# Patient Record
Sex: MALE | Race: WHITE | NOT HISPANIC OR LATINO | Employment: UNEMPLOYED | URBAN - METROPOLITAN AREA
[De-identification: names, ages, dates, MRNs, and addresses within clinical notes are randomized per-mention and may not be internally consistent; named-entity substitution may affect disease eponyms.]

---

## 2023-04-04 ENCOUNTER — OFFICE VISIT (OUTPATIENT)
Dept: PRIMARY CARE | Facility: CLINIC | Age: 63
End: 2023-04-04
Payer: COMMERCIAL

## 2023-04-04 VITALS
BODY MASS INDEX: 28.63 KG/M2 | HEIGHT: 73 IN | WEIGHT: 216 LBS | TEMPERATURE: 98 F | RESPIRATION RATE: 18 BRPM | SYSTOLIC BLOOD PRESSURE: 118 MMHG | OXYGEN SATURATION: 98 % | DIASTOLIC BLOOD PRESSURE: 76 MMHG | HEART RATE: 85 BPM

## 2023-04-04 DIAGNOSIS — Z00.00 HEALTH CARE MAINTENANCE: Primary | ICD-10-CM

## 2023-04-04 DIAGNOSIS — I10 HYPERTENSION, ESSENTIAL, BENIGN: ICD-10-CM

## 2023-04-04 DIAGNOSIS — R45.89 DEPRESSED MOOD: ICD-10-CM

## 2023-04-04 DIAGNOSIS — E55.9 VITAMIN D DEFICIENCY: ICD-10-CM

## 2023-04-04 DIAGNOSIS — R39.12 WEAK URINE STREAM: ICD-10-CM

## 2023-04-04 DIAGNOSIS — R68.89 FORGETFULNESS: ICD-10-CM

## 2023-04-04 DIAGNOSIS — E78.5 HYPERLIPIDEMIA, UNSPECIFIED HYPERLIPIDEMIA TYPE: ICD-10-CM

## 2023-04-04 DIAGNOSIS — R73.03 PREDIABETES: ICD-10-CM

## 2023-04-04 DIAGNOSIS — I45.10 RBBB (RIGHT BUNDLE BRANCH BLOCK): ICD-10-CM

## 2023-04-04 PROBLEM — E80.6 HYPERBILIRUBINEMIA: Status: ACTIVE | Noted: 2023-04-04

## 2023-04-04 PROBLEM — K57.90 DIVERTICULOSIS: Status: ACTIVE | Noted: 2023-04-04

## 2023-04-04 PROBLEM — M10.9 GOUT: Status: ACTIVE | Noted: 2023-04-04

## 2023-04-04 LAB
POC APPEARANCE, URINE: CLEAR
POC BILIRUBIN, URINE: NEGATIVE
POC BLOOD, URINE: NEGATIVE
POC COLOR, URINE: YELLOW
POC GLUCOSE, URINE: NEGATIVE MG/DL
POC KETONES, URINE: NEGATIVE MG/DL
POC LEUKOCYTES, URINE: NEGATIVE
POC NITRITE,URINE: NEGATIVE
POC PH, URINE: 7 PH
POC PROTEIN, URINE: NEGATIVE MG/DL
POC SPECIFIC GRAVITY, URINE: 1.01
POC UROBILINOGEN, URINE: 0.2 EU/DL

## 2023-04-04 PROCEDURE — 1036F TOBACCO NON-USER: CPT | Performed by: FAMILY MEDICINE

## 2023-04-04 PROCEDURE — 93000 ELECTROCARDIOGRAM COMPLETE: CPT | Performed by: FAMILY MEDICINE

## 2023-04-04 PROCEDURE — 3078F DIAST BP <80 MM HG: CPT | Performed by: FAMILY MEDICINE

## 2023-04-04 PROCEDURE — 90471 IMMUNIZATION ADMIN: CPT | Performed by: FAMILY MEDICINE

## 2023-04-04 PROCEDURE — 81002 URINALYSIS NONAUTO W/O SCOPE: CPT | Performed by: FAMILY MEDICINE

## 2023-04-04 PROCEDURE — 3074F SYST BP LT 130 MM HG: CPT | Performed by: FAMILY MEDICINE

## 2023-04-04 PROCEDURE — 90750 HZV VACC RECOMBINANT IM: CPT | Performed by: FAMILY MEDICINE

## 2023-04-04 PROCEDURE — 99396 PREV VISIT EST AGE 40-64: CPT | Performed by: FAMILY MEDICINE

## 2023-04-04 RX ORDER — AMLODIPINE BESYLATE 10 MG/1
10 TABLET ORAL DAILY
COMMUNITY
Start: 2020-05-08 | End: 2023-04-28 | Stop reason: SDUPTHER

## 2023-04-04 RX ORDER — LOSARTAN POTASSIUM 100 MG/1
100 TABLET ORAL DAILY
COMMUNITY
Start: 2018-01-23 | End: 2023-04-28 | Stop reason: SDUPTHER

## 2023-04-04 RX ORDER — ATORVASTATIN CALCIUM 40 MG/1
40 TABLET, FILM COATED ORAL DAILY
COMMUNITY
Start: 2018-01-23 | End: 2023-04-28 | Stop reason: SDUPTHER

## 2023-04-04 ASSESSMENT — ENCOUNTER SYMPTOMS
SHORTNESS OF BREATH: 0
HEADACHES: 0

## 2023-04-04 NOTE — PROGRESS NOTES
"Subjective     Matt Disla is a 62 y.o. male who presents for Annual Exam (Pt. In for a complete physical.).    HPI   Pt is here for well exam.  He is concerned about some forgetfulness and mood changes over the past few months.  He is interested in getting evaluated.  He reports family history of alzheimers.      Review of Systems   Respiratory:  Negative for shortness of breath.    Cardiovascular:  Negative for chest pain.   Neurological:  Negative for headaches.       Objective     Vitals:    04/04/23 1005   BP: 118/76   BP Location: Right arm   Patient Position: Sitting   Pulse: 85   Resp: 18   Temp: 36.7 °C (98 °F)   TempSrc: Temporal   SpO2: 98%   Weight: 98 kg (216 lb)   Height: 1.854 m (6' 1\")        Current Outpatient Medications   Medication Instructions    amLODIPine (NORVASC) 10 mg, oral, Daily    atorvastatin (LIPITOR) 40 mg, oral, Daily    losartan (COZAAR) 100 mg, oral, Daily        Past Surgical History:   Procedure Laterality Date    OTHER SURGICAL HISTORY  12/17/2019    Tonsillectomy with adenoidectomy    OTHER SURGICAL HISTORY  04/01/2022    Colonoscopy        Social History     Tobacco Use    Smoking status: Never    Smokeless tobacco: Never        No family history on file.     Immunization History   Administered Date(s) Administered    Pfizer Gray Cap SARS-CoV-2 04/11/2022    Pfizer Purple Cap SARS-CoV-2 02/13/2021, 03/06/2021, 11/02/2021    Pfizer Sars-cov-2 Bivalent 30 mcg/0.3 mL 10/07/2022    Tdap 12/07/2017        Physical Exam  Vitals reviewed.   Constitutional:       General: He is not in acute distress.     Appearance: Normal appearance. He is not ill-appearing.   HENT:      Head: Normocephalic and atraumatic.      Right Ear: Tympanic membrane, ear canal and external ear normal.      Left Ear: Tympanic membrane, ear canal and external ear normal.      Nose: Nose normal.      Mouth/Throat:      Mouth: Mucous membranes are moist.      Pharynx: Oropharynx is clear. No oropharyngeal " exudate or posterior oropharyngeal erythema.   Eyes:      Extraocular Movements: Extraocular movements intact.      Pupils: Pupils are equal, round, and reactive to light.   Neck:      Thyroid: No thyroid mass or thyromegaly.   Cardiovascular:      Rate and Rhythm: Normal rate and regular rhythm.      Heart sounds: No murmur heard.  Pulmonary:      Effort: Pulmonary effort is normal. No respiratory distress.      Breath sounds: Normal breath sounds. No wheezing, rhonchi or rales.   Abdominal:      General: Abdomen is flat. There is no distension.      Palpations: Abdomen is soft. There is no mass.      Tenderness: There is no abdominal tenderness.   Genitourinary:     Testes: Normal.   Musculoskeletal:         General: Normal range of motion.   Lymphadenopathy:      Cervical: No cervical adenopathy.   Skin:     General: Skin is warm and dry.      Findings: No lesion or rash.   Neurological:      General: No focal deficit present.      Mental Status: He is alert and oriented to person, place, and time. Mental status is at baseline.      Gait: Gait normal.   Psychiatric:         Mood and Affect: Mood normal.         Behavior: Behavior normal.         Problem List Items Addressed This Visit       Health care maintenance - Primary    Relevant Orders    POCT UA (nonautomated w/o microscopy) manually resulted (Completed)    ECG 12 lead (Clinic Performed) (Completed)    Comprehensive Metabolic Panel    Lipid Panel    CBC and Auto Differential    Zoster vaccine, recombinant, adult (SHINGRIX)    Hyperlipidemia    Hypertension, essential, benign    Prediabetes    Relevant Orders    Hemoglobin A1C    Weak urine stream    Relevant Orders    Prostate Specific Antigen    RBBB (right bundle branch block)     Other Visit Diagnoses       Vitamin D deficiency        Relevant Orders    Vitamin D, Total    Forgetfulness        Relevant Orders    Referral to Neuropsychology    Vitamin B12    TSH with reflex to Free T4 if abnormal     Depressed mood        Relevant Orders    Referral to Neuropsychology    Vitamin B12    TSH with reflex to Free T4 if abnormal            Assessment/Plan     Well exam    Colon screening - utd, repeat 2026    Shingrix #1 given today    Tdap utd    Covid and flu vaccines utd    Healthy diet, routine exercise was discussed with patient      Hx of RBBB on ekg.      Cognitive difficulties - Trouble remembering things and recent bout of depression a few months ago - pt concerned about Alzheimers disesea due to family hx and wants to get an evaluation.  Referred to neuropsychology.      HTN, HLD - controlled, check labs, continue meds    Weak urine stream - check PSA.  We discussed urology referral and/or starting flomax.    Pt declined at this time.  Will monitor    Follow up  3 months or sooner if needed

## 2023-04-14 ENCOUNTER — LAB (OUTPATIENT)
Dept: LAB | Facility: LAB | Age: 63
End: 2023-04-14
Payer: COMMERCIAL

## 2023-04-14 DIAGNOSIS — R73.03 PREDIABETES: ICD-10-CM

## 2023-04-14 DIAGNOSIS — R39.12 WEAK URINE STREAM: ICD-10-CM

## 2023-04-14 DIAGNOSIS — R68.89 FORGETFULNESS: ICD-10-CM

## 2023-04-14 DIAGNOSIS — Z00.00 HEALTH CARE MAINTENANCE: ICD-10-CM

## 2023-04-14 DIAGNOSIS — R45.89 DEPRESSED MOOD: ICD-10-CM

## 2023-04-14 DIAGNOSIS — E55.9 VITAMIN D DEFICIENCY: ICD-10-CM

## 2023-04-14 LAB
ALANINE AMINOTRANSFERASE (SGPT) (U/L) IN SER/PLAS: 20 U/L (ref 10–52)
ALBUMIN (G/DL) IN SER/PLAS: 4.4 G/DL (ref 3.4–5)
ALKALINE PHOSPHATASE (U/L) IN SER/PLAS: 68 U/L (ref 33–136)
ANION GAP IN SER/PLAS: 12 MMOL/L (ref 10–20)
ASPARTATE AMINOTRANSFERASE (SGOT) (U/L) IN SER/PLAS: 20 U/L (ref 9–39)
BASOPHILS (10*3/UL) IN BLOOD BY AUTOMATED COUNT: 0.04 X10E9/L (ref 0–0.1)
BASOPHILS/100 LEUKOCYTES IN BLOOD BY AUTOMATED COUNT: 0.6 % (ref 0–2)
BILIRUBIN TOTAL (MG/DL) IN SER/PLAS: 1.2 MG/DL (ref 0–1.2)
CALCIDIOL (25 OH VITAMIN D3) (NG/ML) IN SER/PLAS: 32 NG/ML
CALCIUM (MG/DL) IN SER/PLAS: 10.2 MG/DL (ref 8.6–10.3)
CARBON DIOXIDE, TOTAL (MMOL/L) IN SER/PLAS: 28 MMOL/L (ref 21–32)
CHLORIDE (MMOL/L) IN SER/PLAS: 102 MMOL/L (ref 98–107)
CHOLESTEROL (MG/DL) IN SER/PLAS: 154 MG/DL (ref 0–199)
CHOLESTEROL IN HDL (MG/DL) IN SER/PLAS: 37.3 MG/DL
CHOLESTEROL/HDL RATIO: 4.1
COBALAMIN (VITAMIN B12) (PG/ML) IN SER/PLAS: 443 PG/ML (ref 211–911)
CREATININE (MG/DL) IN SER/PLAS: 0.88 MG/DL (ref 0.5–1.3)
EOSINOPHILS (10*3/UL) IN BLOOD BY AUTOMATED COUNT: 0.15 X10E9/L (ref 0–0.7)
EOSINOPHILS/100 LEUKOCYTES IN BLOOD BY AUTOMATED COUNT: 2.3 % (ref 0–6)
ERYTHROCYTE DISTRIBUTION WIDTH (RATIO) BY AUTOMATED COUNT: 12 % (ref 11.5–14.5)
ERYTHROCYTE MEAN CORPUSCULAR HEMOGLOBIN CONCENTRATION (G/DL) BY AUTOMATED: 32.9 G/DL (ref 32–36)
ERYTHROCYTE MEAN CORPUSCULAR VOLUME (FL) BY AUTOMATED COUNT: 91 FL (ref 80–100)
ERYTHROCYTES (10*6/UL) IN BLOOD BY AUTOMATED COUNT: 4.75 X10E12/L (ref 4.5–5.9)
ESTIMATED AVERAGE GLUCOSE FOR HBA1C: 123 MG/DL
GFR MALE: >90 ML/MIN/1.73M2
GLUCOSE (MG/DL) IN SER/PLAS: 100 MG/DL (ref 74–99)
HEMATOCRIT (%) IN BLOOD BY AUTOMATED COUNT: 43.1 % (ref 41–52)
HEMOGLOBIN (G/DL) IN BLOOD: 14.2 G/DL (ref 13.5–17.5)
HEMOGLOBIN A1C/HEMOGLOBIN TOTAL IN BLOOD: 5.9 %
IMMATURE GRANULOCYTES/100 LEUKOCYTES IN BLOOD BY AUTOMATED COUNT: 0.5 % (ref 0–0.9)
LDL: 84 MG/DL (ref 0–99)
LEUKOCYTES (10*3/UL) IN BLOOD BY AUTOMATED COUNT: 6.6 X10E9/L (ref 4.4–11.3)
LYMPHOCYTES (10*3/UL) IN BLOOD BY AUTOMATED COUNT: 1.34 X10E9/L (ref 1.2–4.8)
LYMPHOCYTES/100 LEUKOCYTES IN BLOOD BY AUTOMATED COUNT: 20.3 % (ref 13–44)
MONOCYTES (10*3/UL) IN BLOOD BY AUTOMATED COUNT: 0.62 X10E9/L (ref 0.1–1)
MONOCYTES/100 LEUKOCYTES IN BLOOD BY AUTOMATED COUNT: 9.4 % (ref 2–10)
NEUTROPHILS (10*3/UL) IN BLOOD BY AUTOMATED COUNT: 4.42 X10E9/L (ref 1.2–7.7)
NEUTROPHILS/100 LEUKOCYTES IN BLOOD BY AUTOMATED COUNT: 66.9 % (ref 40–80)
PLATELETS (10*3/UL) IN BLOOD AUTOMATED COUNT: 287 X10E9/L (ref 150–450)
POTASSIUM (MMOL/L) IN SER/PLAS: 3.9 MMOL/L (ref 3.5–5.3)
PROSTATE SPECIFIC AG (NG/ML) IN SER/PLAS: 1.36 NG/ML (ref 0–4)
PROTEIN TOTAL: 7.4 G/DL (ref 6.4–8.2)
SODIUM (MMOL/L) IN SER/PLAS: 138 MMOL/L (ref 136–145)
THYROTROPIN (MIU/L) IN SER/PLAS BY DETECTION LIMIT <= 0.05 MIU/L: 0.89 MIU/L (ref 0.44–3.98)
TRIGLYCERIDE (MG/DL) IN SER/PLAS: 164 MG/DL (ref 0–149)
UREA NITROGEN (MG/DL) IN SER/PLAS: 16 MG/DL (ref 6–23)
VLDL: 33 MG/DL (ref 0–40)

## 2023-04-14 PROCEDURE — 82306 VITAMIN D 25 HYDROXY: CPT

## 2023-04-14 PROCEDURE — 80061 LIPID PANEL: CPT

## 2023-04-14 PROCEDURE — 84153 ASSAY OF PSA TOTAL: CPT

## 2023-04-14 PROCEDURE — 85025 COMPLETE CBC W/AUTO DIFF WBC: CPT

## 2023-04-14 PROCEDURE — 83036 HEMOGLOBIN GLYCOSYLATED A1C: CPT

## 2023-04-14 PROCEDURE — 84443 ASSAY THYROID STIM HORMONE: CPT

## 2023-04-14 PROCEDURE — 36415 COLL VENOUS BLD VENIPUNCTURE: CPT

## 2023-04-14 PROCEDURE — 82607 VITAMIN B-12: CPT

## 2023-04-14 PROCEDURE — 80053 COMPREHEN METABOLIC PANEL: CPT

## 2023-04-28 DIAGNOSIS — E78.5 HYPERLIPIDEMIA, UNSPECIFIED HYPERLIPIDEMIA TYPE: ICD-10-CM

## 2023-04-28 DIAGNOSIS — I10 HYPERTENSION, ESSENTIAL, BENIGN: Primary | ICD-10-CM

## 2023-04-28 RX ORDER — LOSARTAN POTASSIUM 100 MG/1
100 TABLET ORAL DAILY
Qty: 90 TABLET | Refills: 1 | Status: SHIPPED | OUTPATIENT
Start: 2023-04-28 | End: 2023-11-09

## 2023-04-28 RX ORDER — AMLODIPINE BESYLATE 10 MG/1
10 TABLET ORAL DAILY
Qty: 90 TABLET | Refills: 1 | Status: SHIPPED | OUTPATIENT
Start: 2023-04-28 | End: 2023-11-09

## 2023-04-28 RX ORDER — ATORVASTATIN CALCIUM 40 MG/1
40 TABLET, FILM COATED ORAL DAILY
Qty: 90 TABLET | Refills: 1 | Status: SHIPPED | OUTPATIENT
Start: 2023-04-28 | End: 2023-11-09

## 2023-04-28 NOTE — TELEPHONE ENCOUNTER
Pt called to have a new script for amlodipine, losartan and atorvastatin   [facility]

## 2023-11-07 ENCOUNTER — OFFICE VISIT (OUTPATIENT)
Dept: PRIMARY CARE | Facility: CLINIC | Age: 63
End: 2023-11-07
Payer: COMMERCIAL

## 2023-11-07 VITALS
BODY MASS INDEX: 28.89 KG/M2 | HEART RATE: 92 BPM | OXYGEN SATURATION: 98 % | RESPIRATION RATE: 18 BRPM | SYSTOLIC BLOOD PRESSURE: 128 MMHG | DIASTOLIC BLOOD PRESSURE: 84 MMHG | TEMPERATURE: 98 F | WEIGHT: 218 LBS | HEIGHT: 73 IN

## 2023-11-07 DIAGNOSIS — I10 HYPERTENSION, ESSENTIAL, BENIGN: ICD-10-CM

## 2023-11-07 DIAGNOSIS — E78.5 HYPERLIPIDEMIA, UNSPECIFIED HYPERLIPIDEMIA TYPE: Primary | ICD-10-CM

## 2023-11-07 DIAGNOSIS — R73.03 PREDIABETES: ICD-10-CM

## 2023-11-07 LAB — POC HEMOGLOBIN A1C: 5.6 % (ref 4.2–6.5)

## 2023-11-07 PROCEDURE — 3074F SYST BP LT 130 MM HG: CPT | Performed by: FAMILY MEDICINE

## 2023-11-07 PROCEDURE — 90471 IMMUNIZATION ADMIN: CPT | Performed by: FAMILY MEDICINE

## 2023-11-07 PROCEDURE — 83036 HEMOGLOBIN GLYCOSYLATED A1C: CPT | Performed by: FAMILY MEDICINE

## 2023-11-07 PROCEDURE — 90750 HZV VACC RECOMBINANT IM: CPT | Performed by: FAMILY MEDICINE

## 2023-11-07 PROCEDURE — 99214 OFFICE O/P EST MOD 30 MIN: CPT | Performed by: FAMILY MEDICINE

## 2023-11-07 PROCEDURE — 1036F TOBACCO NON-USER: CPT | Performed by: FAMILY MEDICINE

## 2023-11-07 PROCEDURE — 3079F DIAST BP 80-89 MM HG: CPT | Performed by: FAMILY MEDICINE

## 2023-11-07 ASSESSMENT — ENCOUNTER SYMPTOMS
HYPERTENSION: 1
SHORTNESS OF BREATH: 0
HEADACHES: 0

## 2023-11-07 NOTE — PROGRESS NOTES
"Subjective     Matt Disla is a 63 y.o. male who presents for Hypertension and Hyperlipidemia.    Hypertension  Associated symptoms include anxiety. Pertinent negatives include no chest pain, headaches or shortness of breath.   Hyperlipidemia  Pertinent negatives include no chest pain or shortness of breath.        Patient is here today for hypertension recheck.  Pt does not check his blood pressures at home.  Patient is on amlodipine 10 mg daily, losartan 100 mg daily.   Patient is tolerating the medications well.  Pt is a  - for [employer].      He has HLD, on statin therapy, no side effects.     His labs from April 2023 were good     Review of Systems   Respiratory:  Negative for shortness of breath.    Cardiovascular:  Negative for chest pain.   Neurological:  Negative for headaches.       Objective     Vitals:    11/07/23 1006 11/07/23 1038   BP: 135/88 128/84   BP Location: Right arm    Patient Position: Sitting    Pulse: 92    Resp: 18    Temp: 36.7 °C (98 °F)    TempSrc: Temporal    SpO2: 98%    Weight: 98.9 kg (218 lb)    Height: 1.854 m (6' 1\")         Current Outpatient Medications   Medication Instructions    amLODIPine (NORVASC) 10 mg, oral, Daily    atorvastatin (LIPITOR) 40 mg, oral, Daily    losartan (COZAAR) 100 mg, oral, Daily        Past Surgical History:   Procedure Laterality Date    OTHER SURGICAL HISTORY  12/17/2019    Tonsillectomy with adenoidectomy    OTHER SURGICAL HISTORY  04/01/2022    Colonoscopy        Social History     Tobacco Use    Smoking status: Never    Smokeless tobacco: Never        No family history on file.     Immunization History   Administered Date(s) Administered    Flu vaccine (IIV4), preservative free *Check age/dose* 09/04/2020, 10/08/2021, 10/05/2022    Pfizer COVID-19 vaccine, bivalent, age 12 years and older (30 mcg/0.3 mL) 10/07/2022    Pfizer Gray Cap SARS-CoV-2 04/11/2022    Pfizer Purple Cap SARS-CoV-2 02/13/2021, 03/06/2021, 11/02/2021    Tdap " vaccine, age 7 year and older (BOOSTRIX) 12/07/2017    Zoster vaccine, recombinant, adult (SHINGRIX) 04/04/2023, 11/07/2023        Physical Exam  Vitals reviewed.   Constitutional:       General: He is not in acute distress.     Appearance: Normal appearance. He is well-developed.   HENT:      Head: Normocephalic and atraumatic.   Eyes:      General: Lids are normal.      Conjunctiva/sclera:      Right eye: Right conjunctiva is not injected.      Left eye: Left conjunctiva is not injected.   Cardiovascular:      Rate and Rhythm: Normal rate and regular rhythm.      Heart sounds: No murmur heard.  Pulmonary:      Effort: Pulmonary effort is normal. No respiratory distress.      Breath sounds: Normal breath sounds. No wheezing, rhonchi or rales.   Skin:     General: Skin is warm and dry.      Findings: No rash.   Neurological:      Mental Status: He is alert and oriented to person, place, and time. Mental status is at baseline.   Psychiatric:         Mood and Affect: Mood normal.         Behavior: Behavior normal.         Problem List Items Addressed This Visit       Hyperlipidemia - Primary    Hypertension, essential, benign    Prediabetes    Relevant Orders    POCT glycosylated hemoglobin (Hb A1C) manually resulted (Completed)       Assessment/Plan       HTN - controlled on losartan, amlodiopine    HLD - controlled on statin     Hyperglycemia - A1c today was normal at 5.6%     Shingrix #2 given today     Follow up in  3 months

## 2023-11-09 DIAGNOSIS — E78.5 HYPERLIPIDEMIA, UNSPECIFIED HYPERLIPIDEMIA TYPE: ICD-10-CM

## 2023-11-09 DIAGNOSIS — I10 HYPERTENSION, ESSENTIAL, BENIGN: ICD-10-CM

## 2023-11-09 RX ORDER — AMLODIPINE BESYLATE 10 MG/1
10 TABLET ORAL DAILY
Qty: 90 TABLET | Refills: 3 | Status: SHIPPED | OUTPATIENT
Start: 2023-11-09

## 2023-11-09 RX ORDER — LOSARTAN POTASSIUM 100 MG/1
100 TABLET ORAL DAILY
Qty: 90 TABLET | Refills: 3 | Status: SHIPPED | OUTPATIENT
Start: 2023-11-09

## 2023-11-09 RX ORDER — ATORVASTATIN CALCIUM 40 MG/1
40 TABLET, FILM COATED ORAL DAILY
Qty: 90 TABLET | Refills: 3 | Status: SHIPPED | OUTPATIENT
Start: 2023-11-09

## 2024-04-29 ENCOUNTER — OFFICE VISIT (OUTPATIENT)
Dept: PRIMARY CARE | Facility: CLINIC | Age: 64
End: 2024-04-29
Payer: MEDICARE

## 2024-04-29 VITALS
RESPIRATION RATE: 18 BRPM | TEMPERATURE: 98.2 F | OXYGEN SATURATION: 98 % | WEIGHT: 224 LBS | BODY MASS INDEX: 29.69 KG/M2 | HEIGHT: 73 IN | HEART RATE: 90 BPM | DIASTOLIC BLOOD PRESSURE: 80 MMHG | SYSTOLIC BLOOD PRESSURE: 122 MMHG

## 2024-04-29 DIAGNOSIS — Z12.5 SCREENING PSA (PROSTATE SPECIFIC ANTIGEN): ICD-10-CM

## 2024-04-29 DIAGNOSIS — I45.10 RBBB (RIGHT BUNDLE BRANCH BLOCK): ICD-10-CM

## 2024-04-29 DIAGNOSIS — Z00.00 ROUTINE GENERAL MEDICAL EXAMINATION AT HEALTH CARE FACILITY: ICD-10-CM

## 2024-04-29 DIAGNOSIS — E55.9 VITAMIN D DEFICIENCY: ICD-10-CM

## 2024-04-29 DIAGNOSIS — Z00.00 HEALTH CARE MAINTENANCE: Primary | ICD-10-CM

## 2024-04-29 DIAGNOSIS — R73.03 PREDIABETES: ICD-10-CM

## 2024-04-29 DIAGNOSIS — E78.5 HYPERLIPIDEMIA, UNSPECIFIED HYPERLIPIDEMIA TYPE: ICD-10-CM

## 2024-04-29 DIAGNOSIS — R68.82 LOW LIBIDO: ICD-10-CM

## 2024-04-29 DIAGNOSIS — Z13.6 SCREENING FOR CARDIOVASCULAR CONDITION: ICD-10-CM

## 2024-04-29 DIAGNOSIS — I10 HYPERTENSION, ESSENTIAL, BENIGN: ICD-10-CM

## 2024-04-29 DIAGNOSIS — R45.89 DEPRESSED MOOD: ICD-10-CM

## 2024-04-29 LAB
POC APPEARANCE, URINE: ABNORMAL
POC BILIRUBIN, URINE: NEGATIVE
POC BLOOD, URINE: NEGATIVE
POC COLOR, URINE: YELLOW
POC GLUCOSE, URINE: NEGATIVE MG/DL
POC KETONES, URINE: NEGATIVE MG/DL
POC LEUKOCYTES, URINE: NEGATIVE
POC NITRITE,URINE: NEGATIVE
POC PH, URINE: 6 PH
POC PROTEIN, URINE: NEGATIVE MG/DL
POC SPECIFIC GRAVITY, URINE: 1.01
POC UROBILINOGEN, URINE: 0.2 EU/DL

## 2024-04-29 PROCEDURE — 1159F MED LIST DOCD IN RCRD: CPT | Performed by: FAMILY MEDICINE

## 2024-04-29 PROCEDURE — G0438 PPPS, INITIAL VISIT: HCPCS | Performed by: FAMILY MEDICINE

## 2024-04-29 PROCEDURE — 93000 ELECTROCARDIOGRAM COMPLETE: CPT | Performed by: FAMILY MEDICINE

## 2024-04-29 PROCEDURE — 3079F DIAST BP 80-89 MM HG: CPT | Performed by: FAMILY MEDICINE

## 2024-04-29 PROCEDURE — 1158F ADVNC CARE PLAN TLK DOCD: CPT | Performed by: FAMILY MEDICINE

## 2024-04-29 PROCEDURE — 1170F FXNL STATUS ASSESSED: CPT | Performed by: FAMILY MEDICINE

## 2024-04-29 PROCEDURE — 81002 URINALYSIS NONAUTO W/O SCOPE: CPT | Performed by: FAMILY MEDICINE

## 2024-04-29 PROCEDURE — 1036F TOBACCO NON-USER: CPT | Performed by: FAMILY MEDICINE

## 2024-04-29 PROCEDURE — 1160F RVW MEDS BY RX/DR IN RCRD: CPT | Performed by: FAMILY MEDICINE

## 2024-04-29 PROCEDURE — 1123F ACP DISCUSS/DSCN MKR DOCD: CPT | Performed by: FAMILY MEDICINE

## 2024-04-29 PROCEDURE — 3074F SYST BP LT 130 MM HG: CPT | Performed by: FAMILY MEDICINE

## 2024-04-29 ASSESSMENT — ACTIVITIES OF DAILY LIVING (ADL)
MANAGING_FINANCES: INDEPENDENT
BATHING: INDEPENDENT
TAKING_MEDICATION: INDEPENDENT
GROCERY_SHOPPING: INDEPENDENT
DRESSING: INDEPENDENT
DOING_HOUSEWORK: INDEPENDENT

## 2024-04-29 ASSESSMENT — PATIENT HEALTH QUESTIONNAIRE - PHQ9
SUM OF ALL RESPONSES TO PHQ9 QUESTIONS 1 AND 2: 0
2. FEELING DOWN, DEPRESSED OR HOPELESS: NOT AT ALL
1. LITTLE INTEREST OR PLEASURE IN DOING THINGS: NOT AT ALL

## 2024-04-29 ASSESSMENT — ENCOUNTER SYMPTOMS
HEADACHES: 0
SHORTNESS OF BREATH: 0

## 2024-04-29 NOTE — PROGRESS NOTES
"Subjective     Matt Disla is a 64 y.o. male who presents for Welcome To Medicare.    HPI     Pt is here today for his welcome to medicare exam.  He is feeling well.     HTN , HLD.  On amlodipine, losartan, atorvastatin.  He does not check his bp at home.  He is a  for [employer].    Hx of RBBB.     No immediate family hx of prostate ca.  Pt reports no lower urinary tract symptoms.     Review of Systems   Respiratory:  Negative for shortness of breath.    Cardiovascular:  Negative for chest pain.   Neurological:  Negative for headaches.       Objective     Vitals:    04/29/24 1044 04/29/24 1126   BP: 132/88 122/80   BP Location: Left arm    Patient Position: Sitting    Pulse: 90    Resp: 18    Temp: 36.8 °C (98.2 °F)    TempSrc: Temporal    SpO2: 98%    Weight: 102 kg (224 lb)    Height: 1.854 m (6' 1\")         Current Outpatient Medications   Medication Instructions    amLODIPine (NORVASC) 10 mg, oral, Daily    atorvastatin (LIPITOR) 40 mg, oral, Daily    losartan (COZAAR) 100 mg, oral, Daily        No Known Allergies     Past Surgical History:   Procedure Laterality Date    OTHER SURGICAL HISTORY  12/17/2019    Tonsillectomy with adenoidectomy    OTHER SURGICAL HISTORY  04/01/2022    Colonoscopy        Social History     Tobacco Use    Smoking status: Never    Smokeless tobacco: Never   Vaping Use    Vaping status: Never Used        Social History     Substance and Sexual Activity   Alcohol Use None       No family history on file.     Immunization History   Administered Date(s) Administered    Flu vaccine (IIV4), preservative free *Check age/dose* 09/04/2020, 10/08/2021, 10/05/2022    Pfizer COVID-19 vaccine, Fall 2023, 12 years and older, (30mcg/0.3mL) 11/18/2023    Pfizer COVID-19 vaccine, bivalent, age 12 years and older (30 mcg/0.3 mL) 10/07/2022    Pfizer Gray Cap SARS-CoV-2 04/11/2022    Pfizer Purple Cap SARS-CoV-2 02/13/2021, 03/06/2021, 11/02/2021    Tdap vaccine, age 7 year and " older (BOOSTRIX, ADACEL) 12/07/2017    Zoster vaccine, recombinant, adult (SHINGRIX) 04/04/2023, 11/07/2023        Physical Exam  Vitals reviewed.   Constitutional:       General: He is not in acute distress.     Appearance: Normal appearance. He is not ill-appearing.   HENT:      Head: Normocephalic and atraumatic.      Right Ear: Tympanic membrane, ear canal and external ear normal.      Left Ear: Tympanic membrane, ear canal and external ear normal.      Nose: Nose normal.      Mouth/Throat:      Mouth: Mucous membranes are moist.      Pharynx: No oropharyngeal exudate or posterior oropharyngeal erythema.   Eyes:      Conjunctiva/sclera: Conjunctivae normal.   Neck:      Thyroid: No thyroid mass or thyromegaly.   Cardiovascular:      Rate and Rhythm: Normal rate and regular rhythm.      Heart sounds: No murmur heard.  Pulmonary:      Effort: No respiratory distress.      Breath sounds: Normal breath sounds. No wheezing, rhonchi or rales.   Abdominal:      General: Abdomen is flat. There is no distension.      Palpations: Abdomen is soft. There is no mass.      Tenderness: There is no abdominal tenderness.   Musculoskeletal:         General: Normal range of motion.   Lymphadenopathy:      Cervical: No cervical adenopathy.   Skin:     General: Skin is warm and dry.      Findings: No lesion or rash.   Neurological:      Mental Status: He is alert and oriented to person, place, and time. Mental status is at baseline.   Psychiatric:         Mood and Affect: Mood normal.         Behavior: Behavior normal.         Problem List Items Addressed This Visit       Health care maintenance - Primary    Relevant Orders    POCT UA (nonautomated) manually resulted (Completed)    ECG 12 lead (Clinic Performed) (Completed)    Comprehensive Metabolic Panel    Lipid Panel    CBC and Auto Differential    CT cardiac scoring wo IV contrast    Hyperlipidemia    Relevant Orders    Comprehensive Metabolic Panel    Lipid Panel    CBC and  Auto Differential    CT cardiac scoring wo IV contrast    Hypertension, essential, benign    Relevant Orders    Comprehensive Metabolic Panel    Lipid Panel    CBC and Auto Differential    CT cardiac scoring wo IV contrast    Prediabetes    Relevant Orders    Hemoglobin A1C    CT cardiac scoring wo IV contrast    RBBB (right bundle branch block)     Other Visit Diagnoses       Screening for cardiovascular condition        Relevant Orders    CT cardiac scoring wo IV contrast    Routine general medical examination at health care facility        Screening PSA (prostate specific antigen)        Relevant Orders    Prostate Specific Antigen    Vitamin D deficiency        Relevant Orders    Vitamin D 25-Hydroxy,Total (for eval of Vitamin D levels)    Low libido        Relevant Orders    TSH with reflex to Free T4 if abnormal    Testosterone    Vitamin B12    Depressed mood        Relevant Orders    TSH with reflex to Free T4 if abnormal    Testosterone    Vitamin B12            Assessment/Plan     Welcome to Medicare well exam    Colon screening - utd, repeat April 2026    Shingrix vaccine status - utd      Tdap utd     Pneumonia vaccine status - Prevnar 20 strongly recommended, pt declines.  He is aware of risk of pneumonia.      I recommend yearly flu vaccine and routine COVID vaccinations as indicated     Healthy diet, routine exercise was discussed with patient      Living will/MPOA discussed    Screening questions completed (Fall /depression/ADL/IADL/Home Safety/Functional ability)    Hx of RBBB on ekg.      HTN, HLD - controlled, check labs, continue meds    Cardiac risk assessment - I will order a CT calcium score due to patient risk factors.        Screening PSA - pt denies LUT symptoms.  He is requesting his PSA be checked. He is aware of possible lack of insurance coverage for PSA lab testing.       No hx of smoking     Low libido, depressed mood - requesting testosterone level be checked.       Follow up  3  months or sooner if needed

## 2024-05-13 ENCOUNTER — LAB (OUTPATIENT)
Dept: LAB | Facility: LAB | Age: 64
End: 2024-05-13
Payer: MEDICARE

## 2024-05-13 DIAGNOSIS — R68.82 LOW LIBIDO: ICD-10-CM

## 2024-05-13 DIAGNOSIS — R45.89 DEPRESSED MOOD: ICD-10-CM

## 2024-05-13 DIAGNOSIS — E78.5 HYPERLIPIDEMIA, UNSPECIFIED HYPERLIPIDEMIA TYPE: ICD-10-CM

## 2024-05-13 DIAGNOSIS — Z12.5 SCREENING PSA (PROSTATE SPECIFIC ANTIGEN): ICD-10-CM

## 2024-05-13 DIAGNOSIS — R73.03 PREDIABETES: ICD-10-CM

## 2024-05-13 DIAGNOSIS — E55.9 VITAMIN D DEFICIENCY: ICD-10-CM

## 2024-05-13 DIAGNOSIS — I10 HYPERTENSION, ESSENTIAL, BENIGN: ICD-10-CM

## 2024-05-13 DIAGNOSIS — Z00.00 HEALTH CARE MAINTENANCE: ICD-10-CM

## 2024-05-13 LAB
25(OH)D3 SERPL-MCNC: 28 NG/ML (ref 30–100)
ALBUMIN SERPL BCP-MCNC: 4.3 G/DL (ref 3.4–5)
ALP SERPL-CCNC: 71 U/L (ref 33–136)
ALT SERPL W P-5'-P-CCNC: 21 U/L (ref 10–52)
ANION GAP SERPL CALC-SCNC: 10 MMOL/L (ref 10–20)
AST SERPL W P-5'-P-CCNC: 21 U/L (ref 9–39)
BASOPHILS # BLD AUTO: 0.03 X10*3/UL (ref 0–0.1)
BASOPHILS NFR BLD AUTO: 0.5 %
BILIRUB SERPL-MCNC: 1.3 MG/DL (ref 0–1.2)
BUN SERPL-MCNC: 11 MG/DL (ref 6–23)
CALCIUM SERPL-MCNC: 9.4 MG/DL (ref 8.6–10.3)
CHLORIDE SERPL-SCNC: 102 MMOL/L (ref 98–107)
CHOLEST SERPL-MCNC: 150 MG/DL (ref 0–199)
CHOLESTEROL/HDL RATIO: 3.8
CO2 SERPL-SCNC: 29 MMOL/L (ref 21–32)
CREAT SERPL-MCNC: 0.8 MG/DL (ref 0.5–1.3)
EGFRCR SERPLBLD CKD-EPI 2021: >90 ML/MIN/1.73M*2
EOSINOPHIL # BLD AUTO: 0.11 X10*3/UL (ref 0–0.7)
EOSINOPHIL NFR BLD AUTO: 1.9 %
ERYTHROCYTE [DISTWIDTH] IN BLOOD BY AUTOMATED COUNT: 12.2 % (ref 11.5–14.5)
EST. AVERAGE GLUCOSE BLD GHB EST-MCNC: 120 MG/DL
GLUCOSE SERPL-MCNC: 117 MG/DL (ref 74–99)
HBA1C MFR BLD: 5.8 %
HCT VFR BLD AUTO: 41.7 % (ref 41–52)
HDLC SERPL-MCNC: 39.7 MG/DL
HGB BLD-MCNC: 14 G/DL (ref 13.5–17.5)
IMM GRANULOCYTES # BLD AUTO: 0.02 X10*3/UL (ref 0–0.7)
IMM GRANULOCYTES NFR BLD AUTO: 0.4 % (ref 0–0.9)
LDLC SERPL CALC-MCNC: 77 MG/DL
LYMPHOCYTES # BLD AUTO: 1.46 X10*3/UL (ref 1.2–4.8)
LYMPHOCYTES NFR BLD AUTO: 25.8 %
MCH RBC QN AUTO: 30.9 PG (ref 26–34)
MCHC RBC AUTO-ENTMCNC: 33.6 G/DL (ref 32–36)
MCV RBC AUTO: 92 FL (ref 80–100)
MONOCYTES # BLD AUTO: 0.6 X10*3/UL (ref 0.1–1)
MONOCYTES NFR BLD AUTO: 10.6 %
NEUTROPHILS # BLD AUTO: 3.43 X10*3/UL (ref 1.2–7.7)
NEUTROPHILS NFR BLD AUTO: 60.8 %
NON HDL CHOLESTEROL: 110 MG/DL (ref 0–149)
NRBC BLD-RTO: 0 /100 WBCS (ref 0–0)
PLATELET # BLD AUTO: 258 X10*3/UL (ref 150–450)
POTASSIUM SERPL-SCNC: 4.3 MMOL/L (ref 3.5–5.3)
PROT SERPL-MCNC: 6.8 G/DL (ref 6.4–8.2)
PSA SERPL-MCNC: 1.93 NG/ML
RBC # BLD AUTO: 4.53 X10*6/UL (ref 4.5–5.9)
SODIUM SERPL-SCNC: 137 MMOL/L (ref 136–145)
TESTOST SERPL-MCNC: 235 NG/DL (ref 240–1000)
TRIGL SERPL-MCNC: 166 MG/DL (ref 0–149)
TSH SERPL-ACNC: 0.97 MIU/L (ref 0.44–3.98)
VIT B12 SERPL-MCNC: 387 PG/ML (ref 211–911)
VLDL: 33 MG/DL (ref 0–40)
WBC # BLD AUTO: 5.7 X10*3/UL (ref 4.4–11.3)

## 2024-05-13 PROCEDURE — 80053 COMPREHEN METABOLIC PANEL: CPT

## 2024-05-13 PROCEDURE — 80061 LIPID PANEL: CPT

## 2024-05-13 PROCEDURE — 84443 ASSAY THYROID STIM HORMONE: CPT

## 2024-05-13 PROCEDURE — 82607 VITAMIN B-12: CPT

## 2024-05-13 PROCEDURE — G0103 PSA SCREENING: HCPCS

## 2024-05-13 PROCEDURE — 83036 HEMOGLOBIN GLYCOSYLATED A1C: CPT

## 2024-05-13 PROCEDURE — 84403 ASSAY OF TOTAL TESTOSTERONE: CPT

## 2024-05-13 PROCEDURE — 36415 COLL VENOUS BLD VENIPUNCTURE: CPT

## 2024-05-13 PROCEDURE — 82306 VITAMIN D 25 HYDROXY: CPT

## 2024-05-13 PROCEDURE — 85025 COMPLETE CBC W/AUTO DIFF WBC: CPT

## 2024-05-15 DIAGNOSIS — R79.89 LOW TESTOSTERONE IN MALE: Primary | ICD-10-CM

## 2024-06-03 ENCOUNTER — TELEPHONE (OUTPATIENT)
Dept: PRIMARY CARE | Facility: CLINIC | Age: 64
End: 2024-06-03
Payer: MEDICARE

## 2024-06-03 ENCOUNTER — APPOINTMENT (OUTPATIENT)
Dept: RADIOLOGY | Facility: CLINIC | Age: 64
End: 2024-06-03
Payer: MEDICARE

## 2024-06-04 ENCOUNTER — LAB (OUTPATIENT)
Dept: LAB | Facility: LAB | Age: 64
End: 2024-06-04
Payer: MEDICARE

## 2024-06-04 DIAGNOSIS — R79.89 LOW TESTOSTERONE IN MALE: ICD-10-CM

## 2024-06-04 LAB
FSH SERPL-ACNC: 5.9 IU/L
LH SERPL-ACNC: 2.4 IU/L
PROLACTIN SERPL-MCNC: 4.2 UG/L (ref 2–18)
TESTOST SERPL-MCNC: 258 NG/DL (ref 240–1000)

## 2024-06-04 PROCEDURE — 83001 ASSAY OF GONADOTROPIN (FSH): CPT

## 2024-06-04 PROCEDURE — 84403 ASSAY OF TOTAL TESTOSTERONE: CPT

## 2024-06-04 PROCEDURE — 36415 COLL VENOUS BLD VENIPUNCTURE: CPT

## 2024-06-04 PROCEDURE — 83002 ASSAY OF GONADOTROPIN (LH): CPT

## 2024-06-04 PROCEDURE — 84146 ASSAY OF PROLACTIN: CPT

## 2024-07-25 ENCOUNTER — HOSPITAL ENCOUNTER (OUTPATIENT)
Dept: RADIOLOGY | Facility: CLINIC | Age: 64
Discharge: HOME | End: 2024-07-25
Payer: MEDICARE

## 2024-07-25 DIAGNOSIS — Z13.6 SCREENING FOR CARDIOVASCULAR CONDITION: ICD-10-CM

## 2024-07-25 DIAGNOSIS — Z00.00 HEALTH CARE MAINTENANCE: ICD-10-CM

## 2024-07-25 DIAGNOSIS — I10 HYPERTENSION, ESSENTIAL, BENIGN: ICD-10-CM

## 2024-07-25 DIAGNOSIS — R73.03 PREDIABETES: ICD-10-CM

## 2024-07-25 DIAGNOSIS — E78.5 HYPERLIPIDEMIA, UNSPECIFIED HYPERLIPIDEMIA TYPE: ICD-10-CM

## 2024-07-25 PROCEDURE — 75571 CT HRT W/O DYE W/CA TEST: CPT

## 2024-07-31 DIAGNOSIS — I77.89 ECTASIA OF ARTERY (CMS-HCC): Primary | ICD-10-CM

## 2024-08-13 ENCOUNTER — OFFICE VISIT (OUTPATIENT)
Dept: CARDIOLOGY | Facility: CLINIC | Age: 64
End: 2024-08-13
Payer: MEDICARE

## 2024-08-13 VITALS
SYSTOLIC BLOOD PRESSURE: 120 MMHG | OXYGEN SATURATION: 98 % | WEIGHT: 221.8 LBS | HEIGHT: 73 IN | BODY MASS INDEX: 29.4 KG/M2 | DIASTOLIC BLOOD PRESSURE: 68 MMHG | HEART RATE: 66 BPM | RESPIRATION RATE: 18 BRPM

## 2024-08-13 DIAGNOSIS — I45.10 RBBB: ICD-10-CM

## 2024-08-13 DIAGNOSIS — E78.00 HYPERCHOLESTEROLEMIA: ICD-10-CM

## 2024-08-13 DIAGNOSIS — I77.819 AORTIC ECTASIA (CMS-HCC): Primary | ICD-10-CM

## 2024-08-13 DIAGNOSIS — I77.89 ECTASIA OF ARTERY (CMS-HCC): ICD-10-CM

## 2024-08-13 DIAGNOSIS — I10 BENIGN ESSENTIAL HYPERTENSION: ICD-10-CM

## 2024-08-13 PROBLEM — R41.0 CONFUSION: Status: ACTIVE | Noted: 2024-08-13

## 2024-08-13 PROBLEM — S06.0XAA CONCUSSION INJURY OF BODY STRUCTURE: Status: ACTIVE | Noted: 2024-08-13

## 2024-08-13 PROBLEM — R70.0 ELEVATED SED RATE: Status: ACTIVE | Noted: 2024-08-13

## 2024-08-13 PROBLEM — M25.511 SHOULDER PAIN, RIGHT: Status: ACTIVE | Noted: 2024-08-13

## 2024-08-13 PROBLEM — M10.9 ACUTE GOUTY ARTHRITIS: Status: ACTIVE | Noted: 2018-01-23

## 2024-08-13 PROBLEM — S09.90XA HEAD INJURY: Status: ACTIVE | Noted: 2024-08-13

## 2024-08-13 PROBLEM — M25.561 PAIN AND SWELLING OF RIGHT KNEE: Status: ACTIVE | Noted: 2024-08-13

## 2024-08-13 PROBLEM — R73.9 HYPERGLYCEMIA: Status: ACTIVE | Noted: 2024-08-13

## 2024-08-13 PROBLEM — M25.461 PAIN AND SWELLING OF RIGHT KNEE: Status: ACTIVE | Noted: 2024-08-13

## 2024-08-13 PROBLEM — D72.829 LEUKOCYTOSIS: Status: ACTIVE | Noted: 2024-08-13

## 2024-08-13 PROBLEM — D72.829 ELEVATED WBC COUNT: Status: ACTIVE | Noted: 2024-08-13

## 2024-08-13 PROBLEM — M72.2 PLANTAR FASCIITIS: Status: ACTIVE | Noted: 2024-08-13

## 2024-08-13 PROCEDURE — 1036F TOBACCO NON-USER: CPT | Performed by: NURSE PRACTITIONER

## 2024-08-13 PROCEDURE — 3008F BODY MASS INDEX DOCD: CPT | Performed by: NURSE PRACTITIONER

## 2024-08-13 PROCEDURE — 93000 ELECTROCARDIOGRAM COMPLETE: CPT | Performed by: NURSE PRACTITIONER

## 2024-08-13 PROCEDURE — 1159F MED LIST DOCD IN RCRD: CPT | Performed by: NURSE PRACTITIONER

## 2024-08-13 PROCEDURE — 3074F SYST BP LT 130 MM HG: CPT | Performed by: NURSE PRACTITIONER

## 2024-08-13 PROCEDURE — 99204 OFFICE O/P NEW MOD 45 MIN: CPT | Performed by: NURSE PRACTITIONER

## 2024-08-13 PROCEDURE — 3078F DIAST BP <80 MM HG: CPT | Performed by: NURSE PRACTITIONER

## 2024-08-13 PROCEDURE — 1123F ACP DISCUSS/DSCN MKR DOCD: CPT | Performed by: NURSE PRACTITIONER

## 2024-08-13 PROCEDURE — 1160F RVW MEDS BY RX/DR IN RCRD: CPT | Performed by: NURSE PRACTITIONER

## 2024-08-13 NOTE — LETTER
2024     Matt Styles DO   [address]  [address]  [facility] [address]    Patient: Matt Disla   YOB: 1960   Date of Visit: 2024       Dear Dr. Matt Styles, DO:    Thank you for referring Matt Disla to me for evaluation. Below are my notes for this consultation.  If you have questions, please do not hesitate to call me. I look forward to following your patient along with you.       Sincerely,     Tracy M Schwab, APRN-CNP      CC: No Recipients  ______________________________________________________________________________________    Name : Matt Disla   : 1960   MRN : [id number]   ENC Date : 2024    CC:   CV Evaluation and Aortic Ectasia     HPI:    Matt Disla is a 64 y.o. male with PMHx sig for hypertension & hyperlipidemia who presents with his wife Annika today for CV evaluation of newly identified aortic ectasia.    He saw Dr. Styles on routine follow up. In screening a CAC score was ordered & done (24). Score was 0, but the visualized ascending thoracic aorta was mildly ectatic measuring 3.8 cm in diameter.    Matt denies any chest pain, pressure, SOB/TELLO, PND, orthopnea, LE edema, palpitations, lightheadedness, dizziness, or syncope.     Reports that he is very active: Walks the dogs, runs steps, runs 1/4 mile, swimming, lifting weights about 3-4x a week without any cardiac complaints    CV Diagnostics:  CAC score 24: Total 0. the visualized ascending thoracic aorta is mildly ectatic measuring 3.8 cm in diameter.    ROS: unless otherwise noted in the history of present illness, all other systems were reviewed and they are negative for complaints     PMH:  As above    PSH:  noncontributory    SHx:  He reports that he has never smoked. He has been exposed to tobacco smoke. He has never used smokeless tobacco. He reports current alcohol use. He reports that he does not use drugs.    FHx:  No CVD in 1st degree  "relative    Allergies:  Patient has no known allergies.    Outpatient Medications:  Current Outpatient Medications   Medication Instructions   • amLODIPine (NORVASC) 10 mg, oral, Daily   • atorvastatin (LIPITOR) 40 mg, oral, Daily   • losartan (COZAAR) 100 mg, oral, Daily     Last Recorded Vitals:  Vitals:    08/13/24 1457   BP: 120/68   BP Location: Left arm   Patient Position: Sitting   Pulse: 66   Resp: 18   SpO2: 98%   Weight: 101 kg (221 lb 12.8 oz)   Height: 1.854 m (6' 1\")     Physical Exam:  On exam Mr. Matt Disla appears his stated age, is alert and oriented x3, and in no acute distress. His sclera are anicteric and his oropharynx has moist mucous membranes. His neck is supple and without thyromegaly. The JVP is ~5 cm of water above the right atrium. His cardiac exam has regular rhythm, normal S1, S2. No S3/4. There are no murmurs. His lungs are clear to auscultation bilaterally and there is no dullness to percussion. His abdomen is soft, nontender with normoactive bowel sounds. There is no HJR. The extremities are warm and without edema. The skin is dry. There is no rash present. The distal pulses are 2-3+ in all four extremities. His mood and affect are appropriate for todays encounter.      Last Labs:  CBC -  Lab Results   Component Value Date    WBC 5.7 05/13/2024    HGB 14.0 05/13/2024    HCT 41.7 05/13/2024    MCV 92 05/13/2024     05/13/2024       CMP -  Lab Results   Component Value Date    CALCIUM 9.4 05/13/2024    PROT 6.8 05/13/2024    ALBUMIN 4.3 05/13/2024    AST 21 05/13/2024    ALT 21 05/13/2024    ALKPHOS 71 05/13/2024    BILITOT 1.3 (H) 05/13/2024       LIPID PANEL -   Lab Results   Component Value Date    CHOL 150 05/13/2024    TRIG 166 (H) 05/13/2024    HDL 39.7 05/13/2024    CHHDL 3.8 05/13/2024    LDLF 84 04/14/2023    VLDL 33 05/13/2024    NHDL 110 05/13/2024       RENAL FUNCTION PANEL -   Lab Results   Component Value Date    GLUCOSE 117 (H) 05/13/2024     05/13/2024 "    K 4.3 05/13/2024     05/13/2024    CO2 29 05/13/2024    ANIONGAP 10 05/13/2024    BUN 11 05/13/2024    CREATININE 0.80 05/13/2024    GFRMALE >90 04/14/2023    CALCIUM 9.4 05/13/2024    ALBUMIN 4.3 05/13/2024        Lab Results   Component Value Date    HGBA1C 5.8 (H) 05/13/2024    HGBA1C 5.6 11/07/2023     I have reviewed the above labs & diagnostics    Assessment/Plan:  Ectasia of the ascending thoracic aorta. Measuring 3.8 cm on CT scan that was just done. Discussed pathophysiology as well risk factors for progression to aneurysmal dilation. Aggressive modification of risk factors. BP control, diet, exercise, weight loss. Already on statin therapy. Would repeat CTA chest in the next year or 2 for surveillance    Hypertension. Well controlled on current regimen.    Hyperlipidemia. Lipid panel in May 2024 notable for triglycerides of 166. Would re-evaluate in 6 months & if still elevated then consider increase in atorvastatin. For now, remain on current dose.    Right Bundle Branch Block. Chronic    Follow up in 1 year or as needed       Tracy M Schwab, APRN-CNP

## 2024-08-13 NOTE — PROGRESS NOTES
"Name : Matt Disla   : 1960   MRN : [id number]   ENC Date : 2024    CC:   CV Evaluation and Aortic Ectasia     HPI:    Matt Disla is a 64 y.o. male with PMHx sig for hypertension & hyperlipidemia who presents with his wife Annika today for CV evaluation of newly identified aortic ectasia.    He saw Dr. Styles on routine follow up. In screening a CAC score was ordered & done (24). Score was 0, but the visualized ascending thoracic aorta was mildly ectatic measuring 3.8 cm in diameter.    Matt denies any chest pain, pressure, SOB/TELLO, PND, orthopnea, LE edema, palpitations, lightheadedness, dizziness, or syncope.     Reports that he is very active: Walks the dogs, runs steps, runs 1/4 mile, swimming, lifting weights about 3-4x a week without any cardiac complaints    CV Diagnostics:  CAC score 24: Total 0. the visualized ascending thoracic aorta is mildly ectatic measuring 3.8 cm in diameter.    ROS: unless otherwise noted in the history of present illness, all other systems were reviewed and they are negative for complaints     PMH:  As above    PSH:  noncontributory    SHx:  He reports that he has never smoked. He has been exposed to tobacco smoke. He has never used smokeless tobacco. He reports current alcohol use. He reports that he does not use drugs.    FHx:  No CVD in 1st degree relative    Allergies:  Patient has no known allergies.    Outpatient Medications:  Current Outpatient Medications   Medication Instructions    amLODIPine (NORVASC) 10 mg, oral, Daily    atorvastatin (LIPITOR) 40 mg, oral, Daily    losartan (COZAAR) 100 mg, oral, Daily     Last Recorded Vitals:  Vitals:    24 1457   BP: 120/68   BP Location: Left arm   Patient Position: Sitting   Pulse: 66   Resp: 18   SpO2: 98%   Weight: 101 kg (221 lb 12.8 oz)   Height: 1.854 m (6' 1\")     Physical Exam:  On exam Mr. Matt Disla appears his stated age, is alert and oriented x3, and in no acute distress. His sclera " are anicteric and his oropharynx has moist mucous membranes. His neck is supple and without thyromegaly. The JVP is ~5 cm of water above the right atrium. His cardiac exam has regular rhythm, normal S1, S2. No S3/4. There are no murmurs. His lungs are clear to auscultation bilaterally and there is no dullness to percussion. His abdomen is soft, nontender with normoactive bowel sounds. There is no HJR. The extremities are warm and without edema. The skin is dry. There is no rash present. The distal pulses are 2-3+ in all four extremities. His mood and affect are appropriate for todays encounter.      Last Labs:  CBC -  Lab Results   Component Value Date    WBC 5.7 05/13/2024    HGB 14.0 05/13/2024    HCT 41.7 05/13/2024    MCV 92 05/13/2024     05/13/2024       CMP -  Lab Results   Component Value Date    CALCIUM 9.4 05/13/2024    PROT 6.8 05/13/2024    ALBUMIN 4.3 05/13/2024    AST 21 05/13/2024    ALT 21 05/13/2024    ALKPHOS 71 05/13/2024    BILITOT 1.3 (H) 05/13/2024       LIPID PANEL -   Lab Results   Component Value Date    CHOL 150 05/13/2024    TRIG 166 (H) 05/13/2024    HDL 39.7 05/13/2024    CHHDL 3.8 05/13/2024    LDLF 84 04/14/2023    VLDL 33 05/13/2024    NHDL 110 05/13/2024       RENAL FUNCTION PANEL -   Lab Results   Component Value Date    GLUCOSE 117 (H) 05/13/2024     05/13/2024    K 4.3 05/13/2024     05/13/2024    CO2 29 05/13/2024    ANIONGAP 10 05/13/2024    BUN 11 05/13/2024    CREATININE 0.80 05/13/2024    GFRMALE >90 04/14/2023    CALCIUM 9.4 05/13/2024    ALBUMIN 4.3 05/13/2024        Lab Results   Component Value Date    HGBA1C 5.8 (H) 05/13/2024    HGBA1C 5.6 11/07/2023     I have reviewed the above labs & diagnostics    Assessment/Plan:  Ectasia of the ascending thoracic aorta. Measuring 3.8 cm on CT scan that was just done. Discussed pathophysiology as well risk factors for progression to aneurysmal dilation. Aggressive modification of risk factors. BP control, diet,  exercise, weight loss. Already on statin therapy. Would repeat CTA chest in the next year or 2 for surveillance    Hypertension. Well controlled on current regimen.    Hyperlipidemia. Lipid panel in May 2024 notable for triglycerides of 166. Would re-evaluate in 6 months & if still elevated then consider increase in atorvastatin. For now, remain on current dose.    Right Bundle Branch Block. Chronic    Follow up in 1 year or as needed       Tracy M Schwab, APRN-CNP

## 2024-08-13 NOTE — LETTER
2024     Matt Styles DO   [address]  [address]  [facility] [place] [address]    Patient: Matt Disla   YOB: 1960   Date of Visit: 2024       Dear Dr. Matt Styles, DO:    Thank you for referring Matt Disla to me for evaluation. Below are my notes for this consultation.  If you have questions, please do not hesitate to call me. I look forward to following your patient along with you.       Sincerely,     Tracy M Schwab, APRN-CNP      CC: No Recipients  ______________________________________________________________________________________    Name : Matt Disla   : 1960   MRN : [id number]   ENC Date : 2024    CC:   CV Evaluation and Aortic Ectasia     HPI:    Matt Disla is a 64 y.o. male with PMHx sig for hypertension & hyperlipidemia who presents with his wife Annika today for CV evaluation of newly identified aortic ectasia.    He saw Dr. Styles on routine follow up. In screening a CAC score was ordered & done (24). Score was 0, but the visualized ascending thoracic aorta was mildly ectatic measuring 3.8 cm in diameter.    Matt denies any chest pain, pressure, SOB/TELLO, PND, orthopnea, LE edema, palpitations, lightheadedness, dizziness, or syncope.     Reports that he is very active: Walks the dogs, runs steps, runs 1/4 mile, swimming, lifting weights about 3-4x a week without any cardiac complaints    CV Diagnostics:  CAC score 24: Total 0. the visualized ascending thoracic aorta is mildly ectatic measuring 3.8 cm in diameter.    ROS: unless otherwise noted in the history of present illness, all other systems were reviewed and they are negative for complaints     PMH:  As above    PSH:  noncontributory    SHx:  He reports that he has never smoked. He has been exposed to tobacco smoke. He has never used smokeless tobacco. He reports current alcohol use. He reports that he does not use drugs.    FHx:  No CVD in 1st degree  "relative    Allergies:  Patient has no known allergies.    Outpatient Medications:  Current Outpatient Medications   Medication Instructions   • amLODIPine (NORVASC) 10 mg, oral, Daily   • atorvastatin (LIPITOR) 40 mg, oral, Daily   • losartan (COZAAR) 100 mg, oral, Daily     Last Recorded Vitals:  Vitals:    08/13/24 1457   BP: 120/68   BP Location: Left arm   Patient Position: Sitting   Pulse: 66   Resp: 18   SpO2: 98%   Weight: 101 kg (221 lb 12.8 oz)   Height: 1.854 m (6' 1\")     Physical Exam:  On exam Mr. Matt Disla appears his stated age, is alert and oriented x3, and in no acute distress. His sclera are anicteric and his oropharynx has moist mucous membranes. His neck is supple and without thyromegaly. The JVP is ~5 cm of water above the right atrium. His cardiac exam has regular rhythm, normal S1, S2. No S3/4. There are no murmurs. His lungs are clear to auscultation bilaterally and there is no dullness to percussion. His abdomen is soft, nontender with normoactive bowel sounds. There is no HJR. The extremities are warm and without edema. The skin is dry. There is no rash present. The distal pulses are 2-3+ in all four extremities. His mood and affect are appropriate for todays encounter.      Last Labs:  CBC -  Lab Results   Component Value Date    WBC 5.7 05/13/2024    HGB 14.0 05/13/2024    HCT 41.7 05/13/2024    MCV 92 05/13/2024     05/13/2024       CMP -  Lab Results   Component Value Date    CALCIUM 9.4 05/13/2024    PROT 6.8 05/13/2024    ALBUMIN 4.3 05/13/2024    AST 21 05/13/2024    ALT 21 05/13/2024    ALKPHOS 71 05/13/2024    BILITOT 1.3 (H) 05/13/2024       LIPID PANEL -   Lab Results   Component Value Date    CHOL 150 05/13/2024    TRIG 166 (H) 05/13/2024    HDL 39.7 05/13/2024    CHHDL 3.8 05/13/2024    LDLF 84 04/14/2023    VLDL 33 05/13/2024    NHDL 110 05/13/2024       RENAL FUNCTION PANEL -   Lab Results   Component Value Date    GLUCOSE 117 (H) 05/13/2024     05/13/2024 "    K 4.3 05/13/2024     05/13/2024    CO2 29 05/13/2024    ANIONGAP 10 05/13/2024    BUN 11 05/13/2024    CREATININE 0.80 05/13/2024    GFRMALE >90 04/14/2023    CALCIUM 9.4 05/13/2024    ALBUMIN 4.3 05/13/2024        Lab Results   Component Value Date    HGBA1C 5.8 (H) 05/13/2024    HGBA1C 5.6 11/07/2023     I have reviewed the above labs & diagnostics    Assessment/Plan:  Ectasia of the ascending thoracic aorta. Measuring 3.8 cm on CT scan that was just done. Discussed pathophysiology as well risk factors for progression to aneurysmal dilation. Aggressive modification of risk factors. BP control, diet, exercise, weight loss. Already on statin therapy. Would repeat CTA chest in the next year or 2 for surveillance    Hypertension. Well controlled on current regimen.    Hyperlipidemia. Lipid panel in May 2024 notable for triglycerides of 166. Would re-evaluate in 6 months & if still elevated then consider increase in atorvastatin. For now, remain on current dose.    Right Bundle Branch Block. Chronic    Follow up in 1 year or as needed       Tracy M Schwab, APRN-CNP

## 2024-11-02 DIAGNOSIS — E78.5 HYPERLIPIDEMIA, UNSPECIFIED HYPERLIPIDEMIA TYPE: ICD-10-CM

## 2024-11-02 DIAGNOSIS — I10 HYPERTENSION, ESSENTIAL, BENIGN: ICD-10-CM

## 2024-11-04 RX ORDER — AMLODIPINE BESYLATE 10 MG/1
10 TABLET ORAL DAILY
Qty: 90 TABLET | Refills: 1 | Status: SHIPPED | OUTPATIENT
Start: 2024-11-04

## 2024-11-04 RX ORDER — ATORVASTATIN CALCIUM 40 MG/1
40 TABLET, FILM COATED ORAL DAILY
Qty: 90 TABLET | Refills: 1 | Status: SHIPPED | OUTPATIENT
Start: 2024-11-04

## 2024-11-04 RX ORDER — LOSARTAN POTASSIUM 100 MG/1
100 TABLET ORAL DAILY
Qty: 90 TABLET | Refills: 1 | Status: SHIPPED | OUTPATIENT
Start: 2024-11-04

## 2025-04-29 ENCOUNTER — APPOINTMENT (OUTPATIENT)
Facility: CLINIC | Age: 65
End: 2025-04-29
Payer: MEDICARE

## 2025-04-29 VITALS
WEIGHT: 234 LBS | TEMPERATURE: 98.7 F | HEART RATE: 89 BPM | DIASTOLIC BLOOD PRESSURE: 86 MMHG | BODY MASS INDEX: 31.01 KG/M2 | OXYGEN SATURATION: 95 % | HEIGHT: 73 IN | SYSTOLIC BLOOD PRESSURE: 130 MMHG | RESPIRATION RATE: 18 BRPM

## 2025-04-29 DIAGNOSIS — E78.5 HYPERLIPIDEMIA, UNSPECIFIED HYPERLIPIDEMIA TYPE: ICD-10-CM

## 2025-04-29 DIAGNOSIS — Z00.00 ROUTINE GENERAL MEDICAL EXAMINATION AT HEALTH CARE FACILITY: Primary | ICD-10-CM

## 2025-04-29 DIAGNOSIS — Z13.220 LIPID SCREENING: ICD-10-CM

## 2025-04-29 DIAGNOSIS — I10 HYPERTENSION, ESSENTIAL, BENIGN: ICD-10-CM

## 2025-04-29 DIAGNOSIS — R73.03 PREDIABETES: ICD-10-CM

## 2025-04-29 DIAGNOSIS — N39.43 URINARY DRIBBLING: ICD-10-CM

## 2025-04-29 DIAGNOSIS — Z12.5 PROSTATE CANCER SCREENING: ICD-10-CM

## 2025-04-29 DIAGNOSIS — R73.9 HYPERGLYCEMIA: ICD-10-CM

## 2025-04-29 DIAGNOSIS — E55.9 VITAMIN D DEFICIENCY: ICD-10-CM

## 2025-04-29 PROCEDURE — 1159F MED LIST DOCD IN RCRD: CPT | Performed by: FAMILY MEDICINE

## 2025-04-29 PROCEDURE — 1170F FXNL STATUS ASSESSED: CPT | Performed by: FAMILY MEDICINE

## 2025-04-29 PROCEDURE — 3079F DIAST BP 80-89 MM HG: CPT | Performed by: FAMILY MEDICINE

## 2025-04-29 PROCEDURE — G0439 PPPS, SUBSEQ VISIT: HCPCS | Performed by: FAMILY MEDICINE

## 2025-04-29 PROCEDURE — 99213 OFFICE O/P EST LOW 20 MIN: CPT | Performed by: FAMILY MEDICINE

## 2025-04-29 PROCEDURE — 1123F ACP DISCUSS/DSCN MKR DOCD: CPT | Performed by: FAMILY MEDICINE

## 2025-04-29 PROCEDURE — 3075F SYST BP GE 130 - 139MM HG: CPT | Performed by: FAMILY MEDICINE

## 2025-04-29 PROCEDURE — 3008F BODY MASS INDEX DOCD: CPT | Performed by: FAMILY MEDICINE

## 2025-04-29 ASSESSMENT — PATIENT HEALTH QUESTIONNAIRE - PHQ9
1. LITTLE INTEREST OR PLEASURE IN DOING THINGS: NOT AT ALL
2. FEELING DOWN, DEPRESSED OR HOPELESS: NOT AT ALL
SUM OF ALL RESPONSES TO PHQ9 QUESTIONS 1 AND 2: 0

## 2025-04-29 ASSESSMENT — ACTIVITIES OF DAILY LIVING (ADL)
DRESSING: INDEPENDENT
MANAGING_FINANCES: INDEPENDENT
BATHING: INDEPENDENT
GROCERY_SHOPPING: INDEPENDENT
TAKING_MEDICATION: INDEPENDENT
DOING_HOUSEWORK: INDEPENDENT

## 2025-04-29 ASSESSMENT — ENCOUNTER SYMPTOMS
SHORTNESS OF BREATH: 0
HEADACHES: 0

## 2025-04-29 NOTE — PROGRESS NOTES
"Subjective     Matt Disla is a 65 y.o. male who presents for Medicare Annual Wellness Visit Subsequent.    HPI     Pt is here today for his medicare exam.  He is feeling well.      He has HTN , HLD.  On amlodipine, losartan, atorvastatin.  He does not check his bp at home.      He is a  for [employer].      Diet - balanced.  Wife cooks.  Avoids fast food     Physical activity level - he exercises 2-3 times per week - upper lower body weight lifting.      Review of Systems   Respiratory:  Negative for shortness of breath.    Cardiovascular:  Negative for chest pain.   Neurological:  Negative for headaches.       Objective     Vitals:    04/29/25 1330 04/29/25 1410   BP: 131/88 130/86   BP Location: Left arm    Patient Position: Sitting    Pulse: 89    Resp: 18    Temp: 37.1 °C (98.7 °F)    TempSrc: Temporal    SpO2: 95%    Weight: 106 kg (234 lb)    Height: 1.854 m (6' 1\")         Current Outpatient Medications   Medication Instructions    amLODIPine (NORVASC) 10 mg, oral, Daily    atorvastatin (LIPITOR) 40 mg, oral, Daily    losartan (COZAAR) 100 mg, oral, Daily        RX Allergies[1]     Surgical History[2]     Social History[3]     Family History[4]     Immunization History   Administered Date(s) Administered    COVID-19, mRNA, LNP-S, PF, 30 mcg/0.3 mL dose 02/13/2021, 03/06/2021, 11/02/2021    Flu vaccine (IIV4), preservative free *Check age/dose* 09/04/2020, 10/08/2021, 10/05/2022, 11/18/2023    Flu vaccine, trivalent, preservative free, HIGH-DOSE, age 65y+ (Fluzone) 10/29/2024    Moderna COVID-19 vaccine, 12 years and older (50mcg/0.5mL)(Spikevax) 10/29/2024    Pfizer COVID-19 vaccine, 12 years and older, (30mcg/0.3mL) (Comirnaty) 11/18/2023    Pfizer COVID-19 vaccine, bivalent, age 12 years and older (30 mcg/0.3 mL) 10/07/2022    Pfizer Gray Cap SARS-CoV-2 04/11/2022    Tdap vaccine, age 7 year and older (BOOSTRIX, ADACEL) 12/07/2017    Zoster vaccine, recombinant, adult (SHINGRIX) " 04/04/2023, 11/07/2023        Lab Results   Component Value Date    WBC 5.7 05/13/2024    RBC 4.53 05/13/2024    HGB 14.0 05/13/2024    HCT 41.7 05/13/2024    MCV 92 05/13/2024    MCH 30.9 05/13/2024    MCHC 33.6 05/13/2024     05/13/2024     Lab Results   Component Value Date    GLUCOSE 117 (H) 05/13/2024     05/13/2024    K 4.3 05/13/2024     05/13/2024    CO2 29 05/13/2024    ANIONGAP 10 05/13/2024    BUN 11 05/13/2024    CREATININE 0.80 05/13/2024    CALCIUM 9.4 05/13/2024    ALBUMIN 4.3 05/13/2024    ALKPHOS 71 05/13/2024    PROT 6.8 05/13/2024    AST 21 05/13/2024    BILITOT 1.3 (H) 05/13/2024    ALT 21 05/13/2024      Lab Results   Component Value Date    CHOL 150 05/13/2024    HDL 39.7 05/13/2024    CHHDL 3.8 05/13/2024    LDLCALC 77 05/13/2024    VLDL 33 05/13/2024    TRIG 166 (H) 05/13/2024      Lab Results   Component Value Date    TSH 0.97 05/13/2024      Lab Results   Component Value Date    VITD25 28 (L) 05/13/2024      Lab Results   Component Value Date    HGBA1C 5.8 (H) 05/13/2024    FFDLUIYC1M 120 05/13/2024       Physical Exam  Vitals reviewed.   Constitutional:       General: He is not in acute distress.     Appearance: Normal appearance. He is not ill-appearing.   HENT:      Head: Normocephalic and atraumatic.      Right Ear: Tympanic membrane, ear canal and external ear normal.      Left Ear: Tympanic membrane, ear canal and external ear normal.      Mouth/Throat:      Mouth: Mucous membranes are moist.      Pharynx: No oropharyngeal exudate or posterior oropharyngeal erythema.   Neck:      Thyroid: No thyroid mass or thyromegaly.   Cardiovascular:      Rate and Rhythm: Normal rate and regular rhythm.      Heart sounds: No murmur heard.  Pulmonary:      Effort: No respiratory distress.      Breath sounds: Normal breath sounds. No wheezing, rhonchi or rales.   Abdominal:      General: There is no distension.      Palpations: Abdomen is soft.      Tenderness: There is no  abdominal tenderness.   Lymphadenopathy:      Cervical: No cervical adenopathy.   Skin:     General: Skin is warm and dry.      Findings: No rash.   Neurological:      Mental Status: He is alert and oriented to person, place, and time. Mental status is at baseline.   Psychiatric:         Mood and Affect: Mood normal.         Behavior: Behavior normal.         Assessment & Plan  Routine general medical examination at health care facility    Medicare well exam     Colon screening - utd, repeat April 2026     Shingrix vaccine status - utd      Tdap utd     Pneumonia vaccine status - Prevnar 20 strongly recommended, pt declines.       I recommend yearly flu vaccine and routine COVID vaccinations as indicated      Healthy diet, routine exercise was discussed with patient       Living will/MPOA discussed     Screening questions completed (Fall /depression/ADL/IADL/Home Safety/Functional ability)     Orders:    1 Year Follow Up In Primary Care - Wellness Exam; Future    CBC and Auto Differential; Future    Comprehensive Metabolic Panel; Future    Lipid Panel; Future    Lipid screening    Orders:    Lipid Panel; Future    Hyperglycemia    Orders:    Hemoglobin A1C; Future    Prostate cancer screening    Orders:    Prostate Specific Antigen; Future    Hyperlipidemia, unspecified hyperlipidemia type  On statin   CT calcium score of 0 in 7/2024.   Pt has cardio appointment in August 2025.    Orders:    Comprehensive Metabolic Panel; Future    Lipid Panel; Future    Hypertension, essential, benign  Controlled   Orders:    CBC and Auto Differential; Future    Comprehensive Metabolic Panel; Future    Prediabetes    Orders:    Hemoglobin A1C; Future    Urinary dribbling    Orders:    Prostate Specific Antigen; Future    Vitamin D deficiency    Orders:    Vitamin D 25-Hydroxy,Total (for eval of Vitamin D levels); Future                        [1] No Known Allergies  [2]   Past Surgical History:  Procedure Laterality Date    OTHER  SURGICAL HISTORY  12/17/2019    Tonsillectomy with adenoidectomy    OTHER SURGICAL HISTORY  04/01/2022    Colonoscopy   [3]   Social History  Tobacco Use    Smoking status: Never     Passive exposure: Past    Smokeless tobacco: Never   Vaping Use    Vaping status: Never Used   Substance Use Topics    Alcohol use: Yes    Drug use: Never   [4] No family history on file.

## 2025-05-12 DIAGNOSIS — N39.43 URINARY DRIBBLING: Primary | ICD-10-CM

## 2025-05-12 DIAGNOSIS — Z00.00 ROUTINE GENERAL MEDICAL EXAMINATION AT HEALTH CARE FACILITY: ICD-10-CM

## 2025-05-13 LAB
25(OH)D3+25(OH)D2 SERPL-MCNC: 33 NG/ML (ref 30–100)
ALBUMIN SERPL-MCNC: 4.5 G/DL (ref 3.6–5.1)
ALP SERPL-CCNC: 75 U/L (ref 35–144)
ALT SERPL-CCNC: 20 U/L (ref 9–46)
ANION GAP SERPL CALCULATED.4IONS-SCNC: 10 MMOL/L (CALC) (ref 7–17)
AST SERPL-CCNC: 18 U/L (ref 10–35)
BASOPHILS # BLD AUTO: 42 CELLS/UL (ref 0–200)
BASOPHILS NFR BLD AUTO: 0.7 %
BILIRUB SERPL-MCNC: 1.2 MG/DL (ref 0.2–1.2)
BUN SERPL-MCNC: 16 MG/DL (ref 7–25)
CALCIUM SERPL-MCNC: 10.1 MG/DL (ref 8.6–10.3)
CHLORIDE SERPL-SCNC: 101 MMOL/L (ref 98–110)
CHOLEST SERPL-MCNC: 155 MG/DL
CHOLEST/HDLC SERPL: 4.1 (CALC)
CO2 SERPL-SCNC: 29 MMOL/L (ref 20–32)
CREAT SERPL-MCNC: 0.93 MG/DL (ref 0.7–1.35)
EGFRCR SERPLBLD CKD-EPI 2021: 91 ML/MIN/1.73M2
EOSINOPHIL # BLD AUTO: 162 CELLS/UL (ref 15–500)
EOSINOPHIL NFR BLD AUTO: 2.7 %
ERYTHROCYTE [DISTWIDTH] IN BLOOD BY AUTOMATED COUNT: 12.4 % (ref 11–15)
EST. AVERAGE GLUCOSE BLD GHB EST-MCNC: 128 MG/DL
EST. AVERAGE GLUCOSE BLD GHB EST-SCNC: 7.1 MMOL/L
GLUCOSE SERPL-MCNC: 118 MG/DL (ref 65–99)
HBA1C MFR BLD: 6.1 %
HCT VFR BLD AUTO: 44.7 % (ref 38.5–50)
HDLC SERPL-MCNC: 38 MG/DL
HGB BLD-MCNC: 14.7 G/DL (ref 13.2–17.1)
LDLC SERPL CALC-MCNC: 85 MG/DL (CALC)
LYMPHOCYTES # BLD AUTO: 1266 CELLS/UL (ref 850–3900)
LYMPHOCYTES NFR BLD AUTO: 21.1 %
MCH RBC QN AUTO: 30.2 PG (ref 27–33)
MCHC RBC AUTO-ENTMCNC: 32.9 G/DL (ref 32–36)
MCV RBC AUTO: 92 FL (ref 80–100)
MONOCYTES # BLD AUTO: 714 CELLS/UL (ref 200–950)
MONOCYTES NFR BLD AUTO: 11.9 %
NEUTROPHILS # BLD AUTO: 3816 CELLS/UL (ref 1500–7800)
NEUTROPHILS NFR BLD AUTO: 63.6 %
NONHDLC SERPL-MCNC: 117 MG/DL (CALC)
PLATELET # BLD AUTO: 269 THOUSAND/UL (ref 140–400)
PMV BLD REES-ECKER: 9.5 FL (ref 7.5–12.5)
POTASSIUM SERPL-SCNC: 4.6 MMOL/L (ref 3.5–5.3)
PROT SERPL-MCNC: 7.2 G/DL (ref 6.1–8.1)
PSA SERPL-MCNC: 2.52 NG/ML
RBC # BLD AUTO: 4.86 MILLION/UL (ref 4.2–5.8)
SODIUM SERPL-SCNC: 140 MMOL/L (ref 135–146)
TRIGL SERPL-MCNC: 219 MG/DL
WBC # BLD AUTO: 6 THOUSAND/UL (ref 3.8–10.8)

## 2025-05-15 ENCOUNTER — TELEPHONE (OUTPATIENT)
Facility: CLINIC | Age: 65
End: 2025-05-15
Payer: MEDICARE

## 2025-05-15 DIAGNOSIS — E78.5 HYPERLIPIDEMIA, UNSPECIFIED HYPERLIPIDEMIA TYPE: ICD-10-CM

## 2025-05-15 DIAGNOSIS — I10 HYPERTENSION, ESSENTIAL, BENIGN: ICD-10-CM

## 2025-05-15 RX ORDER — ATORVASTATIN CALCIUM 40 MG/1
40 TABLET, FILM COATED ORAL DAILY
Qty: 90 TABLET | Refills: 3 | Status: SHIPPED | OUTPATIENT
Start: 2025-05-15

## 2025-05-15 RX ORDER — AMLODIPINE BESYLATE 10 MG/1
10 TABLET ORAL DAILY
Qty: 90 TABLET | Refills: 3 | Status: SHIPPED | OUTPATIENT
Start: 2025-05-15

## 2025-05-15 RX ORDER — LOSARTAN POTASSIUM 100 MG/1
100 TABLET ORAL DAILY
Qty: 90 TABLET | Refills: 3 | Status: SHIPPED | OUTPATIENT
Start: 2025-05-15

## 2025-05-15 NOTE — TELEPHONE ENCOUNTER
Patient is calling for his lab results, he said someone called and he missed the call  
Statement Selected

## 2025-05-16 DIAGNOSIS — Z12.5 PROSTATE CANCER SCREENING: ICD-10-CM

## 2025-05-16 DIAGNOSIS — Z13.220 LIPID SCREENING: Primary | ICD-10-CM

## 2025-05-16 DIAGNOSIS — R73.03 PREDIABETES: ICD-10-CM

## 2025-05-16 DIAGNOSIS — R97.20 INCREASED PROSTATE SPECIFIC ANTIGEN (PSA) VELOCITY: ICD-10-CM

## 2025-05-16 DIAGNOSIS — E78.5 HYPERLIPIDEMIA, UNSPECIFIED HYPERLIPIDEMIA TYPE: ICD-10-CM

## 2025-05-16 DIAGNOSIS — R73.9 HYPERGLYCEMIA: ICD-10-CM

## 2025-05-24 LAB
APPEARANCE UR: CLEAR
BACTERIA #/AREA URNS HPF: ABNORMAL /HPF
BILIRUB UR QL STRIP: NEGATIVE
COLOR UR: YELLOW
GLUCOSE UR QL STRIP: NEGATIVE
HGB UR QL STRIP: NEGATIVE
HYALINE CASTS #/AREA URNS LPF: ABNORMAL /LPF
KETONES UR QL STRIP: NEGATIVE
LEUKOCYTE ESTERASE UR QL STRIP: NEGATIVE
NITRITE UR QL STRIP: NEGATIVE
PH UR STRIP: 7.5 [PH] (ref 5–8)
PROT UR QL STRIP: ABNORMAL
RBC #/AREA URNS HPF: ABNORMAL /HPF
SERVICE CMNT-IMP: ABNORMAL
SERVICE CMNT-IMP: ABNORMAL
SP GR UR STRIP: 1.01 (ref 1–1.03)
SQUAMOUS #/AREA URNS HPF: ABNORMAL /HPF
WBC #/AREA URNS HPF: ABNORMAL /HPF
YEAST #/AREA URNS HPF: ABNORMAL /HPF

## 2025-05-28 ENCOUNTER — TELEPHONE (OUTPATIENT)
Facility: CLINIC | Age: 65
End: 2025-05-28
Payer: MEDICARE

## 2025-05-28 DIAGNOSIS — R80.9 PROTEINURIA, UNSPECIFIED TYPE: Primary | ICD-10-CM

## 2025-05-28 DIAGNOSIS — R82.90 ABNORMAL FINDING ON URINALYSIS: ICD-10-CM

## 2025-06-11 DIAGNOSIS — R82.90 ABNORMAL FINDING ON URINALYSIS: ICD-10-CM

## 2025-06-11 DIAGNOSIS — R80.9 PROTEINURIA, UNSPECIFIED TYPE: ICD-10-CM

## 2025-06-14 LAB
APPEARANCE UR: CLEAR
BILIRUB UR QL STRIP: NEGATIVE
COLOR UR: YELLOW
GLUCOSE UR QL STRIP: NEGATIVE
HGB UR QL STRIP: NEGATIVE
KETONES UR QL STRIP: NEGATIVE
LEUKOCYTE ESTERASE UR QL STRIP: NEGATIVE
NITRITE UR QL STRIP: NEGATIVE
PH UR STRIP: 6.5 [PH] (ref 5–8)
PROT UR QL STRIP: NEGATIVE
SP GR UR STRIP: 1.01 (ref 1–1.03)

## 2025-08-13 ENCOUNTER — APPOINTMENT (OUTPATIENT)
Dept: CARDIOLOGY | Facility: CLINIC | Age: 65
End: 2025-08-13
Payer: MEDICARE

## 2025-08-13 VITALS
HEART RATE: 96 BPM | WEIGHT: 229.8 LBS | HEIGHT: 73 IN | BODY MASS INDEX: 30.46 KG/M2 | RESPIRATION RATE: 18 BRPM | SYSTOLIC BLOOD PRESSURE: 120 MMHG | OXYGEN SATURATION: 95 % | DIASTOLIC BLOOD PRESSURE: 72 MMHG

## 2025-08-13 DIAGNOSIS — I45.10 RBBB: ICD-10-CM

## 2025-08-13 DIAGNOSIS — I77.819 AORTIC ECTASIA: Primary | ICD-10-CM

## 2025-08-13 DIAGNOSIS — I10 BENIGN ESSENTIAL HYPERTENSION: ICD-10-CM

## 2025-08-13 DIAGNOSIS — E78.00 HYPERCHOLESTEROLEMIA: ICD-10-CM

## 2025-08-13 PROCEDURE — 3074F SYST BP LT 130 MM HG: CPT | Performed by: NURSE PRACTITIONER

## 2025-08-13 PROCEDURE — 1036F TOBACCO NON-USER: CPT | Performed by: NURSE PRACTITIONER

## 2025-08-13 PROCEDURE — 1160F RVW MEDS BY RX/DR IN RCRD: CPT | Performed by: NURSE PRACTITIONER

## 2025-08-13 PROCEDURE — 3008F BODY MASS INDEX DOCD: CPT | Performed by: NURSE PRACTITIONER

## 2025-08-13 PROCEDURE — 3078F DIAST BP <80 MM HG: CPT | Performed by: NURSE PRACTITIONER

## 2025-08-13 PROCEDURE — 99214 OFFICE O/P EST MOD 30 MIN: CPT | Performed by: NURSE PRACTITIONER

## 2025-08-13 PROCEDURE — 93000 ELECTROCARDIOGRAM COMPLETE: CPT | Performed by: NURSE PRACTITIONER

## 2025-08-13 PROCEDURE — 1159F MED LIST DOCD IN RCRD: CPT | Performed by: NURSE PRACTITIONER

## 2025-08-14 LAB
CHOLEST SERPL-MCNC: 156 MG/DL
CHOLEST/HDLC SERPL: 4.2 (CALC)
EST. AVERAGE GLUCOSE BLD GHB EST-MCNC: 131 MG/DL
EST. AVERAGE GLUCOSE BLD GHB EST-SCNC: 7.3 MMOL/L
HBA1C MFR BLD: 6.2 %
HDLC SERPL-MCNC: 37 MG/DL
LDLC SERPL CALC-MCNC: 87 MG/DL (CALC)
NONHDLC SERPL-MCNC: 119 MG/DL (CALC)
PSA SERPL-MCNC: 2.06 NG/ML
TRIGL SERPL-MCNC: 232 MG/DL

## 2025-08-16 DIAGNOSIS — R73.03 PREDIABETES: ICD-10-CM

## 2025-08-16 DIAGNOSIS — R97.20 INCREASED PROSTATE SPECIFIC ANTIGEN (PSA) VELOCITY: ICD-10-CM

## 2025-08-16 DIAGNOSIS — E78.5 HYPERLIPIDEMIA, UNSPECIFIED HYPERLIPIDEMIA TYPE: ICD-10-CM

## 2025-08-27 DIAGNOSIS — E78.5 HYPERLIPIDEMIA, UNSPECIFIED HYPERLIPIDEMIA TYPE: Primary | ICD-10-CM

## 2025-08-27 DIAGNOSIS — E78.1 HYPERTRIGLYCERIDEMIA: ICD-10-CM

## 2025-08-27 DIAGNOSIS — R73.03 PREDIABETES: ICD-10-CM

## 2025-08-27 RX ORDER — OMEGA-3-ACID ETHYL ESTERS 1 G/1
2 CAPSULE, LIQUID FILLED ORAL 2 TIMES DAILY
Qty: 360 CAPSULE | Refills: 3 | Status: SHIPPED | OUTPATIENT
Start: 2025-08-27